# Patient Record
Sex: FEMALE | Race: BLACK OR AFRICAN AMERICAN | NOT HISPANIC OR LATINO | Employment: UNEMPLOYED | URBAN - METROPOLITAN AREA
[De-identification: names, ages, dates, MRNs, and addresses within clinical notes are randomized per-mention and may not be internally consistent; named-entity substitution may affect disease eponyms.]

---

## 2019-06-30 ENCOUNTER — HOSPITAL ENCOUNTER (EMERGENCY)
Facility: HOSPITAL | Age: 39
Discharge: HOME | End: 2019-07-01
Attending: EMERGENCY MEDICINE
Payer: MEDICAID

## 2019-06-30 DIAGNOSIS — S99.911A RIGHT ANKLE INJURY, INITIAL ENCOUNTER: ICD-10-CM

## 2019-06-30 DIAGNOSIS — S99.921A RIGHT FOOT INJURY, INITIAL ENCOUNTER: Primary | ICD-10-CM

## 2019-06-30 DIAGNOSIS — S89.92XA LEFT KNEE INJURY, INITIAL ENCOUNTER: ICD-10-CM

## 2019-06-30 PROBLEM — F31.9 BIPOLAR AFFECTIVE DISORDER: Status: ACTIVE | Noted: 2019-06-30

## 2019-06-30 PROBLEM — G57.90 MONONEURITIS OF LOWER LIMB: Status: ACTIVE | Noted: 2019-06-30

## 2019-06-30 PROBLEM — D23.9: Status: ACTIVE | Noted: 2018-10-23

## 2019-06-30 LAB
B-HCG UR QL: NEGATIVE
CTP QC/QA: YES

## 2019-06-30 PROCEDURE — 25000003 PHARM REV CODE 250: Performed by: NURSE PRACTITIONER

## 2019-06-30 PROCEDURE — 81025 URINE PREGNANCY TEST: CPT | Performed by: NURSE PRACTITIONER

## 2019-06-30 PROCEDURE — 29125 APPL SHORT ARM SPLINT STATIC: CPT | Mod: RT

## 2019-06-30 PROCEDURE — 63600175 PHARM REV CODE 636 W HCPCS: Performed by: NURSE PRACTITIONER

## 2019-06-30 PROCEDURE — 99284 EMERGENCY DEPT VISIT MOD MDM: CPT | Mod: 25

## 2019-06-30 PROCEDURE — 96372 THER/PROPH/DIAG INJ SC/IM: CPT

## 2019-06-30 RX ORDER — LITHIUM CARBONATE 300 MG/1
300 CAPSULE ORAL
COMMUNITY

## 2019-06-30 RX ORDER — METFORMIN HYDROCHLORIDE 500 MG/1
500 TABLET ORAL 2 TIMES DAILY WITH MEALS
COMMUNITY

## 2019-06-30 RX ORDER — KETOROLAC TROMETHAMINE 30 MG/ML
30 INJECTION, SOLUTION INTRAMUSCULAR; INTRAVENOUS
Status: COMPLETED | OUTPATIENT
Start: 2019-06-30 | End: 2019-06-30

## 2019-06-30 RX ORDER — OXYCODONE AND ACETAMINOPHEN 5; 325 MG/1; MG/1
1 TABLET ORAL
Status: COMPLETED | OUTPATIENT
Start: 2019-06-30 | End: 2019-06-30

## 2019-06-30 RX ORDER — BUPROPION HYDROCHLORIDE 100 MG/1
100 TABLET ORAL 2 TIMES DAILY
COMMUNITY

## 2019-06-30 RX ADMIN — KETOROLAC TROMETHAMINE 30 MG: 30 INJECTION, SOLUTION INTRAMUSCULAR; INTRAVENOUS at 11:06

## 2019-06-30 RX ADMIN — OXYCODONE HYDROCHLORIDE AND ACETAMINOPHEN 1 TABLET: 5; 325 TABLET ORAL at 11:06

## 2019-07-01 VITALS
DIASTOLIC BLOOD PRESSURE: 75 MMHG | RESPIRATION RATE: 17 BRPM | OXYGEN SATURATION: 96 % | HEIGHT: 64 IN | BODY MASS INDEX: 46.44 KG/M2 | SYSTOLIC BLOOD PRESSURE: 137 MMHG | HEART RATE: 87 BPM | TEMPERATURE: 98 F | WEIGHT: 272 LBS

## 2019-07-01 RX ORDER — HYDROCODONE BITARTRATE AND ACETAMINOPHEN 5; 325 MG/1; MG/1
1 TABLET ORAL EVERY 6 HOURS PRN
Qty: 8 TABLET | Refills: 0 | Status: SHIPPED | OUTPATIENT
Start: 2019-07-01

## 2019-07-01 RX ORDER — PROMETHAZINE HYDROCHLORIDE 25 MG/1
25 TABLET ORAL EVERY 6 HOURS PRN
Qty: 8 TABLET | Refills: 0 | Status: SHIPPED | OUTPATIENT
Start: 2019-07-01

## 2019-07-01 RX ORDER — NAPROXEN 500 MG/1
500 TABLET ORAL EVERY 12 HOURS PRN
Qty: 15 TABLET | Refills: 0 | Status: SHIPPED | OUTPATIENT
Start: 2019-07-01

## 2019-07-01 NOTE — ED PROVIDER NOTES
Encounter Date: 6/30/2019    This is a SORT/MSE of a 38 y.o. female presenting to the ED with c/o knee pain, ankle pain following a fall down the stairs earlier today. Care will be transferred to an alternate provider when patient is roomed for a full evaluation and final disposition. FREDY Michaud, FNP-C 6/30/2019 9:40 PM       History     Chief Complaint   Patient presents with    Ankle Pain     Pt c/o right ankle pain after slip and fall. Denies LOC.      38-year-old female presenting for evaluation of right ankle, right foot, left knee pain secondary to missing stepped while descending stairs earlier today.  She reportedly struck her anterior left knee on the ground during the fall.  She denies head injury or loss of consciousness.  Also denies abdominal pain, chest pain, back pain, or any additional pain or symptoms. She has not taken any pain medications or attempted any other treatments for her symptoms.        Review of patient's allergies indicates:   Allergen Reactions    Adhesive      Past Medical History:   Diagnosis Date    Depression     Protein deficiency      No past surgical history on file.  No family history on file.  Social History     Tobacco Use    Smoking status: Not on file   Substance Use Topics    Alcohol use: Not on file    Drug use: Not on file     Review of Systems   Constitutional: Negative for chills, fatigue and fever.   HENT: Negative for congestion, ear pain, nosebleeds, postnasal drip, rhinorrhea, sinus pressure and sore throat.    Eyes: Negative for photophobia and pain.   Respiratory: Negative for apnea, cough, choking, chest tightness, shortness of breath, wheezing and stridor.    Cardiovascular: Negative for chest pain, palpitations and leg swelling.   Gastrointestinal: Negative for abdominal pain, constipation, diarrhea, nausea and vomiting.   Genitourinary: Negative for dysuria, flank pain, hematuria, pelvic pain and vaginal discharge.   Musculoskeletal: Positive  for arthralgias (Right ankle and left knee). Negative for back pain, gait problem and neck pain.   Skin: Negative for color change, pallor, rash and wound.   Neurological: Negative for dizziness, seizures, syncope, facial asymmetry, light-headedness and headaches.   Hematological: Negative for adenopathy.   Psychiatric/Behavioral: Negative for confusion. The patient is not nervous/anxious.        Physical Exam     Initial Vitals   BP Pulse Resp Temp SpO2   -- -- -- -- --      MAP       --         Physical Exam    Nursing note and vitals reviewed.  Constitutional: Vital signs are normal. She appears well-developed and well-nourished. She is not diaphoretic. She is Obese . She is cooperative.  Non-toxic appearance. She does not have a sickly appearance. She does not appear ill. No distress.   HENT:   Head: Normocephalic and atraumatic.   Right Ear: External ear normal.   Left Ear: External ear normal.   Nose: Nose normal.   Eyes: Conjunctivae and EOM are normal. Right eye exhibits no discharge. Left eye exhibits no discharge.   Neck: Normal range of motion. Neck supple. No tracheal deviation present.   Cardiovascular: Normal rate.   Pulmonary/Chest: No stridor. No respiratory distress.   Abdominal: Soft. She exhibits no distension. There is no tenderness.   Musculoskeletal:        Right ankle: She exhibits decreased range of motion (Secondary to pain) and swelling. She exhibits no ecchymosis, no deformity, no laceration and normal pulse. Tenderness. Lateral malleolus tenderness found. Achilles tendon normal.        Left upper leg: She exhibits tenderness and bony tenderness. She exhibits no swelling, no edema, no deformity and no laceration.   Swelling and tenderness to palpation overlying the right lateral malleolus.  Pain and swelling radiates to the dorsal aspect of the proximal right foot.  Range of motion of the right angle is limited secondary to pain.  There is no deformity, erythema, induration.  DP pulse is  normal. No ligamentous laxity.  No tenderness overlying the Achilles.    There is tenderness to the anterior aspect of the left knee.  No deformity, swelling, effusion, or other acute abnormality.  Negative anterior and posterior drawer test.  No ligamentous laxity.   Neurological: She is alert and oriented to person, place, and time. She has normal strength. No cranial nerve deficit or sensory deficit.   Skin: Skin is warm and dry.   Psychiatric: She has a normal mood and affect. Her behavior is normal. Judgment and thought content normal.         ED Course   Procedures  Labs Reviewed - No data to display       Imaging Results    None          Medical Decision Making:   History:   Old Medical Records: I decided to obtain old medical records.  Differential Diagnosis:   Fracture, dislocation, Lisfranc injury, sprain/strain, contusion, others  Clinical Tests:   Radiological Study: Ordered and Reviewed  ED Management:  HPI and physical exam as above.    Physical exam of the right ankle and left knee reveals some swelling to the right ankle and tenderness to the lateral aspect of the ankle overlying the lateral malleolus.  Tenderness to the anterior aspect of the left knee without deformity or swelling.  There is no ligamentous laxity either joint.  X-rays of the right ankle, right foot, and left knee show possible of also fracture of the right foot, however given the location of this abnormality and the location of the patient's symptoms I doubt that this represents an acute fracture.  All other x-rays are unremarkable. Doubt emergent pathology.  Applied Ace wrap to the right ankle and place the patient in a walking boot.  Patient provided with crutches.  Advised patient to follow up with Orthopedics for further management.  Will treat with NSAIDs and a very short course of pain medication.  ED return precautions given. All questions regarding diagnosis and plan were answered to the patient's fullest possible  satisfaction. Patient expressed understanding of diagnosis, discharge instructions, and return precautions.                        Clinical Impression:       ICD-10-CM ICD-9-CM   1. Right foot injury, initial encounter S99.921A 959.7   2. Right ankle injury, initial encounter S99.911A 959.7   3. Left knee injury, initial encounter S89.92XA 959.7         Disposition:   Disposition: Discharged  Condition: Stable                        Warner Chin NP  07/01/19 0029

## 2019-07-01 NOTE — DISCHARGE INSTRUCTIONS
Take pain medications as needed only as prescribed.  Do not drive or drink alcohol when taking pain medication because it will make you drowsy.  Schedule a follow-up appointment with orthopedics or your regular doctor for further testing and treatment in 1 week if symptoms do not improve.  Return to the emergency department immediately for any new or worsening symptoms or as needed for any additional concerns.    Thank you for coming to our Emergency Department today. It is important to remember that some problems are difficult to diagnose and may not be found during your first visit. Be sure to follow up with your primary care doctor.  If you do not have one, you may contact the one listed on your discharge paperwork or you may also call the [facility] Appointment Desk at [phone] to schedule an appointment with one.     Return to the ER with any questions/concerns, new/concerning symptoms, worsening or failure to improve. Do not drive or make any important decisions for 24 hours if you have received any pain medications, sedatives or mood altering drugs during your ER visit.